# Patient Record
Sex: FEMALE | Race: BLACK OR AFRICAN AMERICAN | URBAN - METROPOLITAN AREA
[De-identification: names, ages, dates, MRNs, and addresses within clinical notes are randomized per-mention and may not be internally consistent; named-entity substitution may affect disease eponyms.]

---

## 2017-01-26 ENCOUNTER — OFFICE VISIT (OUTPATIENT)
Dept: OBGYN CLINIC | Age: 22
End: 2017-01-26

## 2017-01-26 VITALS
WEIGHT: 138 LBS | BODY MASS INDEX: 26.06 KG/M2 | DIASTOLIC BLOOD PRESSURE: 82 MMHG | HEIGHT: 61 IN | HEART RATE: 105 BPM | SYSTOLIC BLOOD PRESSURE: 122 MMHG

## 2017-01-26 DIAGNOSIS — Z30.41 FAMILY PLANNING, BCP (BIRTH CONTROL PILLS) MAINTENANCE: ICD-10-CM

## 2017-01-26 DIAGNOSIS — N94.6 DYSMENORRHEA: Primary | ICD-10-CM

## 2017-01-26 LAB
HCG URINE, QL. (POC): NEGATIVE
VALID INTERNAL CONTROL?: YES

## 2017-01-26 RX ORDER — NORGESTIMATE AND ETHINYL ESTRADIOL 7DAYSX3 28
1 KIT ORAL DAILY
Qty: 3 PACKAGE | Refills: 6 | Status: SHIPPED | OUTPATIENT
Start: 2017-01-26 | End: 2018-03-13 | Stop reason: SDUPTHER

## 2017-01-26 RX ORDER — NORGESTIMATE AND ETHINYL ESTRADIOL 7DAYSX3 28
KIT ORAL
COMMUNITY
End: 2017-01-26 | Stop reason: SDUPTHER

## 2017-01-26 NOTE — PROGRESS NOTES
[address] [employer] [address]  [address] [facility], [facility] [place]   [phone]      FAMILY PLANNING     Subjective:      Chief complaint:    Chief Complaint   Patient presents with    Well Woman   Viky Hand is a 22 y.o. female presents today requesting OCP refill and pap smear. She has no complaints. Review of Systems:  General ROS: negative for - fever, chills   Gastrointestinal ROS:negative for - abdominal pain, blood in stools, change in bowel habits, constipation, diarrhea, hematemesis or nausea/vomiting  Genito-Urinary ROS: negative for - dysmenorrhea, dyspareunia, dysuria, genital discharge, genital ulcers, hematuria, incontinence, pelvic pain or urinary frequency/urgency    OB History      Para Term  AB TAB SAB Ectopic Multiple Living    0 0 0 0 0 0 0 0 0 0          Gynecology:  Regular menses since menarche. 28 day cycle. Duration 4 days. Moderate flow. Mild cramps. no hx of STI. The current method of family planning is oral contraceptive (estrogen/progesterone). Tried nexplanon in the past but resulted in mood fluctuations after 1 month of use. Past Medical History   Diagnosis Date    Dysmenorrhea 2017    Stomach ulcer 2014    Ulcer, stomach peptic      No hx CAD, liver disease, migraine with aura, VTE  Past Surgical History   Procedure Laterality Date    Hx orthopaedic       foot    Hx lymphadenectomy       submandibular, benign     Social History     Social History    Marital status: SINGLE     Spouse name: N/A    Number of children: N/A    Years of education: N/A     Occupational History    Not on file.      Social History Main Topics    Smoking status: Never Smoker    Smokeless tobacco: Never Used    Alcohol use No    Drug use: 6.00 per week     Special: Marijuana    Sexual activity: Yes     Partners: Male     Birth control/ protection: Pill     Other Topics Concern    Not on file     Social History Narrative Family History   Problem Relation Age of Onset    No Known Problems Mother     No Known Problems Father      No Known Allergies  No current outpatient prescriptions on file prior to visit. No current facility-administered medications on file prior to visit. Objective:     Visit Vitals    /82    Pulse (!) 105    Ht 5' 1\" (1.549 m)    Wt 138 lb (62.6 kg)    LMP 01/10/2017 (Exact Date)    BMI 26.07 kg/m2       PE deferred. Assessment/Plan:   >50% of 20 minute visit spent counseling on     ICD-10-CM ICD-9-CM    1. Dysmenorrhea N94.6 625.3 norgestimate-ethinyl estradiol (ORTHO TRI-CYCLEN, 28,) 0.18/0.215/0.25 mg-35 mcg (28) tab      AMB POC URINE PREGNANCY TEST, VISUAL COLOR COMPARISON   2. Family planning, BCP (birth control pills) maintenance Z30.41 V25.41 norgestimate-ethinyl estradiol (ORTHO TRI-CYCLEN, 28,) 0.18/0.215/0.25 mg-35 mcg (28) tab      AMB POC URINE PREGNANCY TEST, VISUAL COLOR COMPARISON     Multiple hormonal contraception options reviewed along with individual R/B/A. Patient happy with current OCP and wants to continue. Proper use, common side effects and risks reviewed. Current pap smear screening guidelines discussed. Recommend returning after 22st bday for WWE/pap smear. Plan of care discussed. Patient expressed understanding and agreement.

## 2017-01-26 NOTE — PATIENT INSTRUCTIONS
Combination Birth Control Pills: Care Instructions  Your Care Instructions    Combination birth control pills are used to prevent pregnancy. They give you a regular dose of the hormones estrogen and progestin. You take a hormone pill every day to prevent pregnancy. Birth control pills come in packs. The most common type has 3 weeks of hormone pills. Some packs have sugar pills (they do not contain any hormones) for the fourth week. During that fourth no-hormone week, you have your period. After the fourth week (28 days), you start a new pack. Some birth control pills are packaged in different ways. For example, some have hormone pills for the fourth week instead of sugar pills. Taking hormones for the entire month causes you to not have periods or to have fewer periods. Others are packaged so that you have a period every 3 months. Your doctor will tell you what type of pills you have. Follow-up care is a key part of your treatment and safety. Be sure to make and go to all appointments, and call your doctor if you are having problems. It's also a good idea to know your test results and keep a list of the medicines you take. How can you care for yourself at home? How do you take the pill? · Follow your doctor's instructions about when to start taking your pills. Use backup birth control, such as a condom, or don't have intercourse for 7 days after you start your pills. · Take your pills every day, at about the same time of day. To help yourself do this, try to take them when you do something else every day, such as brushing your teeth. What if you forget to take a pill? Always read the label for specific instructions, or call your doctor. Here are some basic guidelines:  · If you miss 1 hormone pill, take it as soon as you remember. Ask your doctor if you may need to use a backup birth control method, such as a condom, or not have intercourse.   · If you miss 2 or more hormone pills, take one as soon as you remember you forgot them. Then read the pill label or call your doctor about instructions on how to take your missed pills. Use a backup method of birth control or don't have intercourse for 7 days. Pregnancy is more likely if you miss more than 1 pill. · If you had intercourse, you can use emergency contraception, such as the morning-after pill (Plan B). You can use emergency contraception for up to 5 days after having had intercourse, but it works best if you take it right away. What else do you need to know? · The pill has side effects. ¨ You may have very light or skipped periods. ¨ You may have bleeding between periods (spotting). This usually decreases after 3 to 4 months. ¨ You may have mood changes, less interest in sex, or weight gain. · The pill may reduce acne, heavy bleeding and cramping, and symptoms of premenstrual syndrome. · Check with your doctor before you use any other medicines, including over-the-counter medicines, vitamins, herbal products, and supplements. Birth control hormones may not work as well to prevent pregnancy when combined with other medicines. · The pill doesn't protect against sexually transmitted infection (STIs), such as herpes or HIV/AIDS. If you're not sure whether your sex partner might have an STI, use a condom to protect against disease. When should you call for help? Call your doctor now or seek immediate medical care if:  · You have severe belly pain. · You have signs of a blood clot, such as:  ¨ Pain in your calf, back of the knee, thigh, or groin. ¨ Redness and swelling in your leg or groin. · You have blurred vision or other problems seeing. · You have a severe headache. · You have severe trouble breathing. Watch closely for changes in your health, and be sure to contact your doctor if:  · You think you might be pregnant. · You think you may be depressed. · You think you may have been exposed to or have a sexually transmitted infection.   Where can you learn more? Go to http://lobo-vianey.info/. Enter G772 in the search box to learn more about \"Combination Birth Control Pills: Care Instructions. \"  Current as of: May 30, 2016  Content Version: 11.1  © 2006-2016 [employer]. Care instructions adapted under license by [employer] (which disclaims liability or warranty for this information). If you have questions about a medical condition or this instruction, always ask your healthcare professional. [address] any warranty or liability for your use of this information. Safer Sex: Care Instructions  Your Care Instructions  Safer sex is a way to reduce your risk of getting an infection spread through sex. It can also help prevent pregnancy. Most infections that are spread through sex, also called sexually transmitted infections or STIs, can be cured. But some can decrease your chances of getting pregnant if they are not treated early. Others, such as herpes, have no cure. And some, such as HIV, can be deadly. Several products can help you practice safer sex and reduce your chance of STIs. One of the best is a condom. There are condoms for men and for women. The female condom is a tube of soft plastic with a closed end that is placed deep into the vagina. You can use a special rubber sheet (dental dam) for protection during oral sex. Latex gloves can keep your hands from touching blood, semen, or other body fluids that can carry infections. Remember that birth control methods such as diaphragms, IUDs, foams, and birth control pills do not stop you from getting STIs. Follow-up care is a key part of your treatment and safety. Be sure to make and go to all appointments, and call your doctor if you are having problems. Its also a good idea to know your test results and keep a list of the medicines you take. How can you care for yourself at home?   · Think about getting shots to prevent hepatitis A and hepatitis B. These two diseases can be spread through sex. You also can get hepatitis A if you eat infected food. · Use condoms or female condoms each time and every time you have sex. · Learn the right way to use a male condom:  ¨ Condoms come in several sizes. Make sure you use the right size. A condom that is too small can break easily. A condom that is too big can slip off during sex. Use a new condom each time you have sex. ¨ Be careful not to poke a hole in the condom when you open the wrapper. ¨ Squeeze the tip of the condom to keep out air. ¨ Pull down the loose skin (foreskin) from the head of an uncircumcised penis. ¨ While squeezing the tip of the condom, unroll it all the way down to the base of the firm penis. ¨ Never use petroleum jelly (such as Vaseline), grease, hand lotion, baby oil, or anything with oil in it. These products can make holes in the condom. ¨ After sex, hold the condom on your penis as you remove your penis from your partner. This will keep semen from spilling out of the condom. · Learn to use a female condom:  ¨ You can put in a female condom up to 8 hours before sex. ¨ Squeeze the smaller ring at the closed end and insert it deep into the vagina. The larger ring at the open end should stay outside the vagina. ¨ During sex, make sure the penis goes into the condom. ¨ After the penis is removed, close the open end of the condom by twisting it. Remove the condom. · Do not use a female condom and male condom at the same time. · Do not have sex with anyone who has symptoms of an STI, such as sores on the genitals or mouth. The herpes virus that causes cold sores can spread to and from the penis and vagina. · Do not drink a lot of alcohol or use drugs before sex. This can cause you to let down your guard and not practice safer sex. · Having one sex partner (who does not have STIs and does not have sex with anyone else) is a sure way to avoid STIs.   · Talk to your partner before you have sex. Find out if he or she has or is at risk for any STI. Keep in mind that a person may be able to spread an STI even if he or she does not have symptoms. You and your partner may want to get an HIV test. You should get tested again 6 months later. Where can you learn more? Go to http://lobo-vianey.info/. Enter Z462 in the search box to learn more about \"Safer Sex: Care Instructions. \"  Current as of: May 27, 2016  Content Version: 11.1  © 3954-1180 [employer]. Care instructions adapted under license by [employer] (which disclaims liability or warranty for this information). If you have questions about a medical condition or this instruction, always ask your healthcare professional. [address] any warranty or liability for your use of this information.

## 2017-08-15 ENCOUNTER — HOSPITAL ENCOUNTER (OUTPATIENT)
Dept: LAB | Age: 22
Discharge: HOME | End: 2017-08-15
Payer: COMMERCIAL

## 2017-08-15 ENCOUNTER — OFFICE VISIT (OUTPATIENT)
Dept: OBGYN CLINIC | Age: 22
End: 2017-08-15

## 2017-08-15 VITALS
BODY MASS INDEX: 23.03 KG/M2 | SYSTOLIC BLOOD PRESSURE: 136 MMHG | HEIGHT: 61 IN | HEART RATE: 88 BPM | WEIGHT: 122 LBS | DIASTOLIC BLOOD PRESSURE: 83 MMHG

## 2017-08-15 DIAGNOSIS — N76.0 VAGINITIS AND VULVOVAGINITIS: ICD-10-CM

## 2017-08-15 DIAGNOSIS — B96.89 BV (BACTERIAL VAGINOSIS): ICD-10-CM

## 2017-08-15 DIAGNOSIS — Z11.3 SCREENING EXAMINATION FOR STD (SEXUALLY TRANSMITTED DISEASE): ICD-10-CM

## 2017-08-15 DIAGNOSIS — N76.0 BV (BACTERIAL VAGINOSIS): ICD-10-CM

## 2017-08-15 DIAGNOSIS — Z11.3 SCREENING EXAMINATION FOR STD (SEXUALLY TRANSMITTED DISEASE): Primary | ICD-10-CM

## 2017-08-15 PROCEDURE — 87491 CHLMYD TRACH DNA AMP PROBE: CPT | Performed by: OBSTETRICS & GYNECOLOGY

## 2017-08-15 RX ORDER — METRONIDAZOLE 7.5 MG/G
1 GEL VAGINAL
Qty: 187.5 MG | Refills: 0 | Status: SHIPPED | OUTPATIENT
Start: 2017-08-15 | End: 2017-08-20

## 2017-08-15 RX ORDER — FLUCONAZOLE 150 MG/1
150 TABLET ORAL DAILY
Qty: 1 TAB | Refills: 0 | Status: SHIPPED | OUTPATIENT
Start: 2017-08-15 | End: 2017-08-16

## 2017-08-15 NOTE — PROGRESS NOTES
Subjective:   25 y.o. female complains of white, copious and thick vaginal discharge for 3 days. .  Denies abnormal vaginal bleeding or significant pelvic pain or  fever. No UTI symptoms. Denies history of known exposure to STD. She used a different detergent on Sunday and noted the discharge. She recently had a new sexual partner on Friday. Patient's last menstrual period was 07/28/2017. Objective:   She appears well, afebrile. Abdomen: benign, soft, nontender, no masses. Pelvic Exam: VULVA: labial discharge, VAGINA: WET MOUNT done - results: KOH done, clue cells, excessive bacteria, hyphae, lactobacilli, CERVIX: cervical discharge present - white, grey and malodorous, UTERUS: uterus is normal size, shape, consistency and nontender. Microscopic wet-mount exam shows see above. .    Assessment/Plan:   monilia vaginitis and bacterial vaginosis  GC and chlamydia DNA  probe sent to lab. Treatment: Flagyl 500 BID x 7 days, MetroGel daily x 5 days and abstain from coitus during course of treatment  ROV prn if symptoms persist or worsen. ICD-10-CM ICD-9-CM    1. Screening examination for STD (sexually transmitted disease) Z11.3 V74.5 CHLAMYDIA/NEISSERIA/TRICHOMONAS AMP   2. BV (bacterial vaginosis) N76.0 616.10 metroNIDAZOLE (METROGEL) 0.75 % vaginal gel    B96.89 041.9    3. Vaginitis and vulvovaginitis N76.0 616.10 fluconazole (DIFLUCAN) 150 mg tablet    Encouraged safer sex.

## 2017-08-15 NOTE — PATIENT INSTRUCTIONS
Vaginal Yeast Infection: Care Instructions  Your Care Instructions  A vaginal yeast infection is caused by too many yeast cells in the vagina. This is common in women of all ages. Itching, vaginal discharge and irritation, and other symptoms can bother you. But yeast infections don't often cause other health problems. Some medicines can increase your risk of getting a yeast infection. These include antibiotics, birth control pills, hormones, and steroids. You may also be more likely to get a yeast infection if you are pregnant, have diabetes, douche, or wear tight clothes. With treatment, most yeast infections get better in 2 to 3 days. Follow-up care is a key part of your treatment and safety. Be sure to make and go to all appointments, and call your doctor if you are having problems. It's also a good idea to know your test results and keep a list of the medicines you take. How can you care for yourself at home? · Take your medicines exactly as prescribed. Call your doctor if you think you are having a problem with your medicine. · Ask your doctor about over-the-counter (OTC) medicines for yeast infections. They may cost less than prescription medicines. If you use an OTC treatment, read and follow all instructions on the label. · Do not use tampons while using a vaginal cream or suppository. The tampons can absorb the medicine. Use pads instead. · Wear loose cotton clothing. Do not wear nylon or other fabric that holds body heat and moisture close to the skin. · Try sleeping without underwear. · Do not scratch. Relieve itching with a cold pack or a cool bath. · Do not wash your vaginal area more than once a day. Use plain water or a mild, unscented soap. Air-dry the vaginal area. · Change out of wet swimsuits after swimming. · Do not have sex until you have finished your treatment. · Do not douche. When should you call for help?   Call your doctor now or seek immediate medical care if:  · You have unexpected vaginal bleeding. · You have new or increased pain in your vagina or pelvis. Watch closely for changes in your health, and be sure to contact your doctor if:  · You have a fever. · You are not getting better after 2 days. · Your symptoms come back after you finish your medicines. Where can you learn more? Go to http://lobo-vianey.info/. Enter D983 in the search box to learn more about \"Vaginal Yeast Infection: Care Instructions. \"  Current as of: October 13, 2016  Content Version: 11.3  © 5712-1383 [employer]. Care instructions adapted under license by [employer] (which disclaims liability or warranty for this information). If you have questions about a medical condition or this instruction, always ask your healthcare professional. [address] any warranty or liability for your use of this information. Bacterial Vaginosis: Care Instructions  Your Care Instructions    Bacterial vaginosis is a type of vaginal infection. It is caused by excess growth of certain bacteria that are normally found in the vagina. Symptoms can include itching, swelling, pain when you urinate or have sex, and a gray or yellow discharge with a \"fishy\" odor. It is not considered an infection that is spread through sexual contact. Although symptoms can be annoying and uncomfortable, bacterial vaginosis does not usually cause other health problems. However, if you have it while you are pregnant, it can cause complications. While the infection may go away on its own, most doctors use antibiotics to treat it. You may have been prescribed pills or vaginal cream. With treatment, bacterial vaginosis usually clears up in 5 to 7 days. Follow-up care is a key part of your treatment and safety. Be sure to make and go to all appointments, and call your doctor if you are having problems.  It's also a good idea to know your test results and keep a list of the medicines you take. How can you care for yourself at home? · Take your antibiotics as directed. Do not stop taking them just because you feel better. You need to take the full course of antibiotics. · Do not eat or drink anything that contains alcohol if you are taking metronidazole (Flagyl). · Keep using your medicine if you start your period. Use pads instead of tampons while using a vaginal cream or suppository. Tampons can absorb the medicine. · Wear loose cotton clothing. Do not wear nylon and other materials that hold body heat and moisture close to the skin. · Do not scratch. Relieve itching with a cold pack or a cool bath. · Do not wash your vaginal area more than once a day. Use plain water or a mild, unscented soap. Do not douche. When should you call for help? Watch closely for changes in your health, and be sure to contact your doctor if:  · You have unexpected vaginal bleeding. · You have a fever. · You have new or increased pain in your vagina or pelvis. · You are not getting better after 1 week. · Your symptoms return after you finish the course of your medicine. Where can you learn more? Go to http://lobo-vianey.info/. Courtney Pate in the search box to learn more about \"Bacterial Vaginosis: Care Instructions. \"  Current as of: October 13, 2016  Content Version: 11.3  © 9663-4913 [employer]. Care instructions adapted under license by [employer] (which disclaims liability or warranty for this information). If you have questions about a medical condition or this instruction, always ask your healthcare professional. [place] [address] any warranty or liability for your use of this information. Safer Sex: Care Instructions  Your Care Instructions  Safer sex is a way to reduce your risk of getting an infection spread through sex. It can also help prevent pregnancy.  Most infections that are spread through sex, also called sexually transmitted infections or STIs, can be cured. But some can decrease your chances of getting pregnant if they are not treated early. Others, such as herpes, have no cure. And some, such as HIV, can be deadly. Several products can help you practice safer sex and reduce your chance of STIs. One of the best is a condom. There are condoms for men and for women. The female condom is a tube of soft plastic with a closed end that is placed deep into the vagina. You can use a special rubber sheet (dental dam) for protection during oral sex. Latex gloves can keep your hands from touching blood, semen, or other body fluids that can carry infections. Remember that birth control methods such as diaphragms, IUDs, foams, and birth control pills do not stop you from getting STIs. Follow-up care is a key part of your treatment and safety. Be sure to make and go to all appointments, and call your doctor if you are having problems. Its also a good idea to know your test results and keep a list of the medicines you take. How can you care for yourself at home? · Think about getting shots to prevent hepatitis A and hepatitis B. These two diseases can be spread through sex. You also can get hepatitis A if you eat infected food. · Use condoms or female condoms each time and every time you have sex. · Learn the right way to use a male condom:  ¨ Condoms come in several sizes. Make sure you use the right size. A condom that is too small can break easily. A condom that is too big can slip off during sex. Use a new condom each time you have sex. ¨ Be careful not to poke a hole in the condom when you open the wrapper. ¨ Squeeze the tip of the condom to keep out air. ¨ Pull down the loose skin (foreskin) from the head of an uncircumcised penis. ¨ While squeezing the tip of the condom, unroll it all the way down to the base of the firm penis.   ¨ Never use petroleum jelly (such as Vaseline), grease, hand lotion, baby oil, or anything with oil in it. These products can make holes in the condom. ¨ After sex, hold the condom on your penis as you remove your penis from your partner. This will keep semen from spilling out of the condom. · Learn to use a female condom:  ¨ You can put in a female condom up to 8 hours before sex. ¨ Squeeze the smaller ring at the closed end and insert it deep into the vagina. The larger ring at the open end should stay outside the vagina. ¨ During sex, make sure the penis goes into the condom. ¨ After the penis is removed, close the open end of the condom by twisting it. Remove the condom. · Do not use a female condom and male condom at the same time. · Do not have sex with anyone who has symptoms of an STI, such as sores on the genitals or mouth. The herpes virus that causes cold sores can spread to and from the penis and vagina. · Do not drink a lot of alcohol or use drugs before sex. This can cause you to let down your guard and not practice safer sex. · Having one sex partner (who does not have STIs and does not have sex with anyone else) is a sure way to avoid STIs. · Talk to your partner before you have sex. Find out if he or she has or is at risk for any STI. Keep in mind that a person may be able to spread an STI even if he or she does not have symptoms. You and your partner may want to get an HIV test. You should get tested again 6 months later. Where can you learn more? Go to http://lobo-vianey.info/. Enter I496 in the search box to learn more about \"Safer Sex: Care Instructions. \"  Current as of: March 20, 2017  Content Version: 11.3  © 3402-7470 [employer]. Care instructions adapted under license by [employer] (which disclaims liability or warranty for this information).  If you have questions about a medical condition or this instruction, always ask your healthcare professional. [address]  any warranty or liability for your use of this information.

## 2017-08-17 LAB
C TRACH RRNA SPEC QL NAA+PROBE: NEGATIVE
N GONORRHOEA RRNA SPEC QL NAA+PROBE: NEGATIVE
SPECIMEN SOURCE: NORMAL
T VAGINALIS RRNA SPEC QL NAA+PROBE: NEGATIVE

## 2018-01-22 ENCOUNTER — OFFICE VISIT (OUTPATIENT)
Dept: OBGYN CLINIC | Age: 23
End: 2018-01-22

## 2018-01-22 VITALS
HEART RATE: 77 BPM | WEIGHT: 132 LBS | HEIGHT: 61 IN | BODY MASS INDEX: 24.92 KG/M2 | SYSTOLIC BLOOD PRESSURE: 129 MMHG | DIASTOLIC BLOOD PRESSURE: 85 MMHG

## 2018-01-22 DIAGNOSIS — B37.31 CANDIDA VAGINITIS: Primary | ICD-10-CM

## 2018-01-22 RX ORDER — FLUCONAZOLE 150 MG/1
150 TABLET ORAL DAILY
Qty: 1 TAB | Refills: 0 | Status: SHIPPED | OUTPATIENT
Start: 2018-01-22 | End: 2018-01-23

## 2018-01-22 RX ORDER — NYSTATIN AND TRIAMCINOLONE ACETONIDE 100000; 1 [USP'U]/G; MG/G
CREAM TOPICAL
Qty: 30 G | Refills: 0 | Status: SHIPPED | OUTPATIENT
Start: 2018-01-22

## 2018-01-22 NOTE — PATIENT INSTRUCTIONS
Vaginal Yeast Infection: Care Instructions  Your Care Instructions    A vaginal yeast infection is caused by too many yeast cells in the vagina. This is common in women of all ages. Itching, vaginal discharge and irritation, and other symptoms can bother you. But yeast infections don't often cause other health problems. Some medicines can increase your risk of getting a yeast infection. These include antibiotics, birth control pills, hormones, and steroids. You may also be more likely to get a yeast infection if you are pregnant, have diabetes, douche, or wear tight clothes. With treatment, most yeast infections get better in 2 to 3 days. Follow-up care is a key part of your treatment and safety. Be sure to make and go to all appointments, and call your doctor if you are having problems. It's also a good idea to know your test results and keep a list of the medicines you take. How can you care for yourself at home? · Take your medicines exactly as prescribed. Call your doctor if you think you are having a problem with your medicine. · Ask your doctor about over-the-counter (OTC) medicines for yeast infections. They may cost less than prescription medicines. If you use an OTC treatment, read and follow all instructions on the label. · Do not use tampons while using a vaginal cream or suppository. The tampons can absorb the medicine. Use pads instead. · Wear loose cotton clothing. Do not wear nylon or other fabric that holds body heat and moisture close to the skin. · Try sleeping without underwear. · Do not scratch. Relieve itching with a cold pack or a cool bath. · Do not wash your vaginal area more than once a day. Use plain water or a mild, unscented soap. Air-dry the vaginal area. · Change out of wet swimsuits after swimming. · Do not have sex until you have finished your treatment. · Do not douche. When should you call for help?   Call your doctor now or seek immediate medical care if:  ? · You have unexpected vaginal bleeding. ? · You have new or increased pain in your vagina or pelvis. ? Watch closely for changes in your health, and be sure to contact your doctor if:  ? · You have a fever. ? · You are not getting better after 2 days. ? · Your symptoms come back after you finish your medicines. Where can you learn more? Go to http://lobo-vianey.info/. Enter V794 in the search box to learn more about \"Vaginal Yeast Infection: Care Instructions. \"  Current as of: October 13, 2016  Content Version: 11.4  © 1623-7516 [employer]. Care instructions adapted under license by [employer] (which disclaims liability or warranty for this information). If you have questions about a medical condition or this instruction, always ask your healthcare professional. [address] any warranty or liability for your use of this information.

## 2018-01-22 NOTE — MR AVS SNAPSHOT
[address] [facility] [address] [address] 
[phone] Patient: Nikko Arreaga MRN: [id number] [facility]:0/95/5346 Visit Information Date & Time Provider Department Dept. Phone Encounter #  
 1/22/2018 11:15 AM Wilma Rakers, MD [facility] [phone] [id number] Follow-up Instructions Return in about 3 weeks (around 2/12/2018) for well woman. Your Appointments 2/5/2018  8:30 AM  
ANNUAL with Socorro Ambrocio MD  
[address] [place] [address] ([facility]) Appt Note: ANNUAL VISIT  
 [place] [id number] [phone]  
  
   
 [place] [phone] Upcoming Health Maintenance Date Due  
 HPV AGE 9Y-34Y (1 of 3 - Female 3 Dose Series) 2/15/2007 PAP AKA CERVICAL CYTOLOGY 2/15/2017 Influenza Age 5 to Adult 8/1/2017 Allergies as of 1/22/2018  Review Complete On: 1/22/2018 By: Socorro Ambrocio MD  
 No Known Allergies Current Immunizations  Never Reviewed No immunizations on file. Not reviewed this visit You Were Diagnosed With   
  
 Codes Comments Candida vaginitis    -  Primary ICD-10-CM: B37.3 ICD-9-CM: 112.1 Vitals BP Pulse Height(growth percentile) Weight(growth percentile) LMP BMI  
 129/85 77 5' 1\" (1.549 m) 132 lb (59.9 kg) 01/08/2018 (Exact Date) 24.94 kg/m2 OB Status Smoking Status Having regular periods Never Smoker BMI and BSA Data Body Mass Index Body Surface Area 24.94 kg/m 2 1.61 m 2 Preferred Pharmacy Pharmacy Name Phone [id number] [address] [facility] Stephan Manrique [phone] Your Updated Medication List  
  
   
This list is accurate as of: 1/22/18 11:47 AM.  Always use your most recent med list.  
  
  
  
  
 fluconazole 150 mg tablet Commonly known as:  DIFLUCAN Take 1 Tab by mouth daily for 1 day. Indications: Vulvovaginal Candidiasis norgestimate-ethinyl estradiol 0.18/0.215/0.25 mg-35 mcg (28) Tab Commonly known as:  ORTHO TRI-CYCLEN (28) Take 1 Tab by mouth daily. nystatin-triamcinolone topical cream  
Commonly known as:  MYCOLOG II Apply  to affected area two (2) times daily as needed for Skin Irritation. Prescriptions Sent to Pharmacy Refills  
 fluconazole (DIFLUCAN) 150 mg tablet 0 Sig: Take 1 Tab by mouth daily for 1 day. Indications: Vulvovaginal Candidiasis Class: Normal  
 Pharmacy: [address] [place] [address] #: [phone] Route: Oral  
 nystatin-triamcinolone (MYCOLOG II) topical cream 0 Sig: Apply  to affected area two (2) times daily as needed for Skin Irritation. Class: Normal  
 Pharmacy: [address] [place] [address] #: [phone] Route: Topical  
  
Follow-up Instructions Return in about 3 weeks (around 2/12/2018) for well woman. Patient Instructions Vaginal Yeast Infection: Care Instructions Your Care Instructions A vaginal yeast infection is caused by too many yeast cells in the vagina. This is common in women of all ages. Itching, vaginal discharge and irritation, and other symptoms can bother you. But yeast infections don't often cause other health problems. Some medicines can increase your risk of getting a yeast infection. These include antibiotics, birth control pills, hormones, and steroids. You may also be more likely to get a yeast infection if you are pregnant, have diabetes, douche, or wear tight clothes. With treatment, most yeast infections get better in 2 to 3 days. Follow-up care is a key part of your treatment and safety. Be sure to make and go to all appointments, and call your doctor if you are having problems. It's also a good idea to know your test results and keep a list of the medicines you take. How can you care for yourself at home? · Take your medicines exactly as prescribed. Call your doctor if you think you are having a problem with your medicine. · Ask your doctor about over-the-counter (OTC) medicines for yeast infections. They may cost less than prescription medicines. If you use an OTC treatment, read and follow all instructions on the label. · Do not use tampons while using a vaginal cream or suppository. The tampons can absorb the medicine. Use pads instead. · Wear loose cotton clothing. Do not wear nylon or other fabric that holds body heat and moisture close to the skin. · Try sleeping without underwear. · Do not scratch. Relieve itching with a cold pack or a cool bath. · Do not wash your vaginal area more than once a day. Use plain water or a mild, unscented soap. Air-dry the vaginal area. · Change out of wet swimsuits after swimming. · Do not have sex until you have finished your treatment. · Do not douche. When should you call for help? Call your doctor now or seek immediate medical care if: 
? · You have unexpected vaginal bleeding. ? · You have new or increased pain in your vagina or pelvis. ? Watch closely for changes in your health, and be sure to contact your doctor if: 
? · You have a fever. ? · You are not getting better after 2 days. ? · Your symptoms come back after you finish your medicines. Where can you learn more? Go to http://lobo-vianey.info/. Enter Q348 in the search box to learn more about \"Vaginal Yeast Infection: Care Instructions. \" Current as of: October 13, 2016 Content Version: 11.4 © 2293-9602 [employer]. Care instructions adapted under license by [employer] (which disclaims liability or warranty for this information). If you have questions about a medical condition or this instruction, always ask your healthcare professional. [address] any warranty or liability for your use of this information. Introducing [facility] & HE[facility]! [facility] introduces Nouvola patient portal. Now you can access parts of your medical record, email your doctor's office, and request medication refills online. 1. In your internet browser, go to [email or web address] 2. Click on the First Time User? Click Here link in the Sign In box. You will see the New Member Sign Up page. 3. Enter your Nouvola Access Code exactly as it appears below. You will not need to use this code after youve completed the sign-up process. If you do not sign up before the expiration date, you must request a new code. · Nouvola Access Code: [email or web address] Expires: 4/22/2018 11:47 AM 
 
4. Enter the last four digits of your Social Security Number (xxxx) and Date of Birth (mm/dd/yyyy) as indicated and click Submit. You will be taken to the next sign-up page. 5. Create a Nouvola ID. This will be your Nouvola login ID and cannot be changed, so think of one that is secure and easy to remember. 6. Create a Nouvola password. You can change your password at any time. 7. Enter your Password Reset Question and Answer. This can be used at a later time if you forget your password. 8. Enter your e-mail address. You will receive e-mail notification when new information is available in [address] 9. Click Sign Up. You can now view and download portions of your medical record. 10. Click the Download Summary menu link to download a portable copy of your medical information. If you have questions, please visit the Frequently Asked Questions section of the Nouvola website. Remember, Nouvola is NOT to be used for urgent needs. For medical emergencies, dial 911. Now available from your iPhone and Android! Please provide this summary of care documentation to your next provider. If you have any questions after today's visit, please call [phone].

## 2018-01-22 NOTE — PROGRESS NOTES
Subjective:   25 y.o. female complains of creamy and malodorous vaginal discharge for 1 week. .  Denies abnormal vaginal bleeding or significant pelvic pain or  fever. No UTI symptoms. Denies history of known exposure to STD. She also notes vaginal irritation from pad use. She has been scratching a lot. Patient's last menstrual period was 01/08/2018 (exact date). Objective:   She appears well, afebrile. Abdomen: benign, soft, nontender, no masses. Pelvic Exam: VULVA: vulva excoriation notes, VAGINA: normal appearing vagina with normal color and discharge, no lesions, CERVIX: normal appearing cervix without discharge or lesions, UTERUS: uterus is normal size, shape, consistency and nontender. Microscopic wet-mount exam shows KOH done, hyphae, lactobacilli. .    Assessment/Plan:   monilia vaginitis   GC and chlamydia DNA  probe sent to lab. Treatment: Flagyl 500 BID x 7 days, MetroGel daily x 5 days and abstain from coitus during course of treatment  ROV prn if symptoms persist or worsen. ICD-10-CM ICD-9-CM    1.  Candida vaginitis B37.3 112.1 fluconazole (DIFLUCAN) 150 mg tablet      nystatin-triamcinolone (MYCOLOG II) topical cream   .

## 2018-03-13 ENCOUNTER — OFFICE VISIT (OUTPATIENT)
Dept: OBGYN CLINIC | Age: 23
End: 2018-03-13

## 2018-03-13 ENCOUNTER — HOSPITAL ENCOUNTER (OUTPATIENT)
Dept: LAB | Age: 23
Discharge: HOME | End: 2018-03-13
Payer: COMMERCIAL

## 2018-03-13 VITALS
WEIGHT: 128 LBS | SYSTOLIC BLOOD PRESSURE: 127 MMHG | HEART RATE: 72 BPM | BODY MASS INDEX: 24.17 KG/M2 | HEIGHT: 61 IN | DIASTOLIC BLOOD PRESSURE: 73 MMHG

## 2018-03-13 DIAGNOSIS — Z01.419 ENCOUNTER FOR GYNECOLOGICAL EXAMINATION WITHOUT ABNORMAL FINDING: ICD-10-CM

## 2018-03-13 DIAGNOSIS — Z30.41 FAMILY PLANNING, BCP (BIRTH CONTROL PILLS) MAINTENANCE: ICD-10-CM

## 2018-03-13 DIAGNOSIS — N94.6 DYSMENORRHEA: ICD-10-CM

## 2018-03-13 DIAGNOSIS — Z12.4 SCREENING FOR MALIGNANT NEOPLASM OF CERVIX: Primary | ICD-10-CM

## 2018-03-13 LAB
HCG URINE, QL. (POC): NEGATIVE
VALID INTERNAL CONTROL?: YES

## 2018-03-13 PROCEDURE — 87491 CHLMYD TRACH DNA AMP PROBE: CPT | Performed by: OBSTETRICS & GYNECOLOGY

## 2018-03-13 PROCEDURE — 88175 CYTOPATH C/V AUTO FLUID REDO: CPT | Performed by: OBSTETRICS & GYNECOLOGY

## 2018-03-13 RX ORDER — NORGESTIMATE AND ETHINYL ESTRADIOL 7DAYSX3 28
1 KIT ORAL DAILY
Qty: 3 PACKAGE | Refills: 4 | Status: SHIPPED | OUTPATIENT
Start: 2018-03-13

## 2018-03-13 NOTE — MR AVS SNAPSHOT
Trinacathy Thomas 
 
 
 [address] [facility] [address] [address] [phone] Patient: Paul Quiñones MRN: [id number] UTM:[birth year] Visit Information Date & Time Provider Department Dept. Phone Encounter #  
 3/13/2018  1:00 PM [phone] [id number] Upcoming Health Maintenance Date Due  
 HPV AGE 9Y-34Y (1 of 3 - Female 3 Dose Series) 2/15/2007 PAP AKA CERVICAL CYTOLOGY 2/15/2017 Influenza Age 5 to Adult 8/1/2017 Allergies as of 3/13/2018  Review Complete On: 3/13/2018 By: Daryle Bucks, MD  
 No Known Allergies Current Immunizations  Never Reviewed No immunizations on file. Not reviewed this visit You Were Diagnosed With   
  
 Codes Comments Screening for malignant neoplasm of cervix    -  Primary ICD-10-CM: Z12.4 ICD-9-CM: V76.2 Encounter for gynecological examination without abnormal finding     ICD-10-CM: Y26.068 ICD-9-CM: V72.31 Dysmenorrhea     ICD-10-CM: N94.6 ICD-9-CM: 625.3 Family planning, BCP (birth control pills) maintenance     ICD-10-CM: Z30.41 ICD-9-CM: V25.41 Vitals BP Pulse Height(growth percentile) Weight(growth percentile) LMP BMI  
 127/73 72 5' 1\" (1.549 m) 128 lb (58.1 kg) 03/03/2018 (Exact Date) 24.19 kg/m2 OB Status Smoking Status Having regular periods Never Smoker Vitals History BMI and BSA Data Body Mass Index Body Surface Area  
 24.19 kg/m 2 1.58 m 2 Preferred Pharmacy Pharmacy Name Phone [id number], [address] [place] [address] Moshe Dee [phone] Your Updated Medication List  
  
   
This list is accurate as of 3/13/18  1:55 PM.  Always use your most recent med list.  
  
  
  
  
 norgestimate-ethinyl estradiol 0.18/0.215/0.25 mg-35 mcg (28) Tab Commonly known as:  ORTHO TRI-CYCLEN (28) Take 1 Tab by mouth daily.   
  
 nystatin-triamcinolone topical cream  
 Commonly known as:  MYCOLOG II Apply  to affected area two (2) times daily as needed for Skin Irritation. We Performed the Following AMB POC URINE PREGNANCY TEST, VISUAL COLOR COMPARISON [46295 CPT(R)] Introducing [facility]! [employer] introduces Hairbobo patient portal. Now you can access parts of your medical record, email your doctor's office, and request medication refills online. 1. In your internet browser, go to [email or web address] 2. Click on the First Time User? Click Here link in the Sign In box. You will see the New Member Sign Up page. 3. Enter your Hairbobo Access Code exactly as it appears below. You will not need to use this code after youve completed the sign-up process. If you do not sign up before the expiration date, you must request a new code. · Hairbobo Access Code: [email or web address] Expires: 4/22/2018 12:47 PM 
 
4. Enter the last four digits of your Social Security Number (xxxx) and Date of Birth (mm/dd/yyyy) as indicated and click Submit. You will be taken to the next sign-up page. 5. Create a Hairbobo ID. This will be your Hairbobo login ID and cannot be changed, so think of one that is secure and easy to remember. 6. Create a Hairbobo password. You can change your password at any time. 7. Enter your Password Reset Question and Answer. This can be used at a later time if you forget your password. 8. Enter your e-mail address. You will receive e-mail notification when new information is available in [address] 9. Click Sign Up. You can now view and download portions of your medical record. 10. Click the Download Summary menu link to download a portable copy of your medical information. If you have questions, please visit the Frequently Asked Questions section of the Hairbobo website. Remember, Hairbobo is NOT to be used for urgent needs. For medical emergencies, dial 911. Now available from your iPhone and Android! Please provide this summary of care documentation to your next provider. If you have any questions after today's visit, please call [phone].

## 2018-03-13 NOTE — PROGRESS NOTES
Subjective:   26 y.o. female for Well Woman Check. Patient's last menstrual period was 03/03/2018 (exact date). Social History: single partner, contraception - none, not currently using prescribed OCPs      Patient Active Problem List    Diagnosis Date Noted    Dysmenorrhea 01/26/2017     Current Outpatient Prescriptions   Medication Sig Dispense Refill    norgestimate-ethinyl estradiol (ORTHO TRI-CYCLEN, 28,) 0.18/0.215/0.25 mg-35 mcg (28) tab Take 1 Tab by mouth daily. 3 Package 4    nystatin-triamcinolone (MYCOLOG II) topical cream Apply  to affected area two (2) times daily as needed for Skin Irritation. 30 g 0     No Known Allergies  Past Medical History:   Diagnosis Date    Dysmenorrhea 1/26/2017    Stomach ulcer 2014    Ulcer, stomach peptic 2004     Past Surgical History:   Procedure Laterality Date    HX LYMPHADENECTOMY      submandibular, benign    HX ORTHOPAEDIC  1997    foot     Family History   Problem Relation Age of Onset    No Known Problems Mother     No Known Problems Father      Social History   Substance Use Topics    Smoking status: Never Smoker    Smokeless tobacco: Never Used    Alcohol use 0.0 oz/week     0 Standard drinks or equivalent per week      Comment: 3        ROS:  Feeling well. No dyspnea or chest pain on exertion. No abdominal pain, change in bowel habits, black or bloody stools. No urinary tract symptoms. GYN ROS: normal menses, heavy bleeding, no pelvic pain or discharge, no breast pain or new or enlarging lumps on self exam. No neurological complaints. No mood complaints. Objective:     Visit Vitals    /73    Pulse 72    Ht 5' 1\" (1.549 m)    Wt 128 lb (58.1 kg)    LMP 03/03/2018 (Exact Date)    BMI 24.19 kg/m2     The patient appears well, alert, oriented x 3, in no distress. Neck supple. No adenopathy or thyromegaly. Lungs are clear, good air entry, no wheezes, rhonchi or rales. S1 and S2 normal, no murmurs, regular rate and rhythm.  Abdomen soft without tenderness, guarding, mass or organomegaly. Extremities show no edema, normal peripheral pulses. Neurological is normal, no focal findings. Normal mood and affect. BREAST EXAM: breasts appear normal, no suspicious masses, no skin or nipple changes or axillary nodes    PELVIC EXAM: VULVA: normal appearing vulva with no masses, tenderness or lesions, VAGINA: normal appearing vagina with normal color and discharge, no lesions, CERVIX: normal appearing cervix without discharge or lesions, UTERUS: uterus is normal size, shape, consistency and nontender, ADNEXA: normal adnexa in size, nontender and no masses    Assessment/Plan:   well woman  pap smear  return annually or prn  STD screening  Contraception management - pt is not good at taking her OCPs as prescribed, she sometimes misses an entire week of pills, we discussed the importance of taking the pills daily to ensure effective contraception, we also discussed multiple other forms of contraception including LARC methods that don't require daily management and can help with her heavy periods; pt will continue with OCPs for now    ICD-10-CM ICD-9-CM    1. Screening for malignant neoplasm of cervix Z12.4 V76.2 PAP IG, CT-NG, RFX HPV ZJYP([id number], [id number])   2. Encounter for gynecological examination without abnormal finding Z01.419 V72.31 AMB POC URINE PREGNANCY TEST, VISUAL COLOR COMPARISON   3. Dysmenorrhea N94.6 625.3 norgestimate-ethinyl estradiol (ORTHO TRI-CYCLEN, 28,) 0.18/0.215/0.25 mg-35 mcg (28) tab   4. Family planning, BCP (birth control pills) maintenance Z30.41 V25.41 norgestimate-ethinyl estradiol (ORTHO TRI-CYCLEN, 28,) 0.18/0.215/0.25 mg-35 mcg (28) tab   .

## 2018-03-14 LAB
C TRACH RRNA SPEC QL NAA+PROBE: NEGATIVE
N GONORRHOEA RRNA SPEC QL NAA+PROBE: NEGATIVE
SPECIMEN SOURCE: NORMAL